# Patient Record
Sex: MALE
[De-identification: names, ages, dates, MRNs, and addresses within clinical notes are randomized per-mention and may not be internally consistent; named-entity substitution may affect disease eponyms.]

---

## 2018-04-20 ENCOUNTER — RX ONLY (OUTPATIENT)
Age: 48
Setting detail: RX ONLY
End: 2018-04-20

## 2018-04-20 ENCOUNTER — APPOINTMENT (RX ONLY)
Dept: URBAN - METROPOLITAN AREA CLINIC 69 | Facility: CLINIC | Age: 48
Setting detail: DERMATOLOGY
End: 2018-04-20

## 2018-04-20 DIAGNOSIS — L30.9 DERMATITIS, UNSPECIFIED: ICD-10-CM

## 2018-04-20 DIAGNOSIS — L73.1 PSEUDOFOLLICULITIS BARBAE: ICD-10-CM

## 2018-04-20 PROCEDURE — ? COUNSELING

## 2018-04-20 PROCEDURE — ? PRESCRIPTION

## 2018-04-20 PROCEDURE — ? TREATMENT REGIMEN

## 2018-04-20 PROCEDURE — 99202 OFFICE O/P NEW SF 15 MIN: CPT

## 2018-04-20 RX ORDER — TERBINAFINE HCL 250 MG
TABLET ORAL
Qty: 14 | Refills: 0 | Status: ERX | COMMUNITY
Start: 2018-04-20

## 2018-04-20 RX ORDER — TAZAROTENE 1 MG/G
CREAM TOPICAL
Qty: 1 | Refills: 3 | Status: ERX | COMMUNITY
Start: 2018-04-20

## 2018-04-20 RX ORDER — BETAMETHASONE DIPROPIONATE 0.5 MG/G
OINTMENT TOPICAL
Qty: 1 | Refills: 2 | Status: ERX | COMMUNITY
Start: 2018-04-20

## 2018-04-20 RX ORDER — FLUOCINONIDE 1 MG/G
CREAM TOPICAL
Qty: 1 | Refills: 3 | Status: ERX | COMMUNITY
Start: 2018-04-20

## 2018-04-20 RX ADMIN — Medication: at 20:03

## 2018-04-20 RX ADMIN — TAZAROTENE: 1 CREAM TOPICAL at 20:12

## 2018-04-20 RX ADMIN — FLUOCINONIDE: 1 CREAM TOPICAL at 20:11

## 2018-04-20 ASSESSMENT — LOCATION ZONE DERM
LOCATION ZONE: NECK
LOCATION ZONE: LEG

## 2018-04-20 ASSESSMENT — LOCATION SIMPLE DESCRIPTION DERM
LOCATION SIMPLE: RIGHT PRETIBIAL REGION
LOCATION SIMPLE: RIGHT ANTERIOR NECK
LOCATION SIMPLE: LEFT ANTERIOR NECK

## 2018-04-20 ASSESSMENT — LOCATION DETAILED DESCRIPTION DERM
LOCATION DETAILED: LEFT INFERIOR LATERAL NECK
LOCATION DETAILED: RIGHT PROXIMAL PRETIBIAL REGION
LOCATION DETAILED: RIGHT SUPERIOR ANTERIOR NECK

## 2018-04-20 NOTE — PROCEDURE: TREATMENT REGIMEN
Plan: Nummular eczema  v. Tinea. Directed to take terbinafine once daily for two weeks and continue using the topical antifungal he bought otc. Direct to rtc in two weeks and will see if treated with orals but if not will treat as eczema and will send for cortisone
Detail Level: Zone

## 2018-04-24 ENCOUNTER — RX ONLY (OUTPATIENT)
Age: 48
Setting detail: RX ONLY
End: 2018-04-24

## 2018-04-24 RX ORDER — TAZAROTENE 1 MG/G
CREAM TOPICAL
Qty: 1 | Refills: 3 | Status: ERX

## 2018-04-24 RX ORDER — FLUOCINONIDE 1 MG/G
CREAM TOPICAL
Qty: 1 | Refills: 3 | Status: ERX

## 2018-04-27 ENCOUNTER — RX ONLY (OUTPATIENT)
Age: 48
Setting detail: RX ONLY
End: 2018-04-27

## 2018-04-27 RX ORDER — FLUOCINONIDE 1 MG/G
CREAM TOPICAL
Qty: 1 | Refills: 3 | Status: ERX

## 2018-04-27 RX ORDER — TAZAROTENE 1 MG/G
CREAM TOPICAL
Qty: 1 | Refills: 3 | Status: ERX

## 2018-05-03 ENCOUNTER — APPOINTMENT (RX ONLY)
Dept: URBAN - METROPOLITAN AREA CLINIC 69 | Facility: CLINIC | Age: 48
Setting detail: DERMATOLOGY
End: 2018-05-03

## 2018-05-03 DIAGNOSIS — L73.1 PSEUDOFOLLICULITIS BARBAE: ICD-10-CM

## 2018-05-03 DIAGNOSIS — L30.0 NUMMULAR DERMATITIS: ICD-10-CM | Status: RESOLVED

## 2018-05-03 PROCEDURE — ? COUNSELING

## 2018-05-03 PROCEDURE — 99213 OFFICE O/P EST LOW 20 MIN: CPT

## 2018-05-03 PROCEDURE — ? TREATMENT REGIMEN

## 2018-05-03 ASSESSMENT — LOCATION DETAILED DESCRIPTION DERM
LOCATION DETAILED: RIGHT PROXIMAL PRETIBIAL REGION
LOCATION DETAILED: LEFT INFERIOR LATERAL NECK
LOCATION DETAILED: RIGHT SUPERIOR ANTERIOR NECK

## 2018-05-03 ASSESSMENT — LOCATION SIMPLE DESCRIPTION DERM
LOCATION SIMPLE: RIGHT PRETIBIAL REGION
LOCATION SIMPLE: LEFT ANTERIOR NECK
LOCATION SIMPLE: RIGHT ANTERIOR NECK

## 2018-05-03 ASSESSMENT — LOCATION ZONE DERM
LOCATION ZONE: NECK
LOCATION ZONE: LEG

## 2018-05-03 NOTE — PROCEDURE: TREATMENT REGIMEN
Detail Level: Zone
Continue Regimen: betamethasone ointment bid to aa prn flares.
Plan: Fluocinonide 0.1% cream after shaving\\nTazarotene 0.1% cream qhs to beard area.

## 2024-01-10 ENCOUNTER — TELEPHONE (OUTPATIENT)
Dept: FAMILY MEDICINE | Facility: CLINIC | Age: 54
End: 2024-01-10

## 2024-01-10 ENCOUNTER — HOSPITAL ENCOUNTER (OUTPATIENT)
Dept: RADIOLOGY | Facility: HOSPITAL | Age: 54
Discharge: HOME | End: 2024-01-10
Attending: STUDENT IN AN ORGANIZED HEALTH CARE EDUCATION/TRAINING PROGRAM
Payer: MEDICAID

## 2024-01-10 ENCOUNTER — OFFICE VISIT (OUTPATIENT)
Dept: FAMILY MEDICINE | Facility: CLINIC | Age: 54
End: 2024-01-10
Payer: MEDICAID

## 2024-01-10 VITALS
WEIGHT: 248 LBS | TEMPERATURE: 98 F | BODY MASS INDEX: 35.5 KG/M2 | HEART RATE: 76 BPM | HEIGHT: 70 IN | OXYGEN SATURATION: 97 % | DIASTOLIC BLOOD PRESSURE: 77 MMHG | SYSTOLIC BLOOD PRESSURE: 114 MMHG

## 2024-01-10 DIAGNOSIS — Z12.12 ENCOUNTER FOR COLORECTAL CANCER SCREENING: ICD-10-CM

## 2024-01-10 DIAGNOSIS — Z23 ENCOUNTER FOR IMMUNIZATION: Primary | ICD-10-CM

## 2024-01-10 DIAGNOSIS — Z00.00 WELLNESS EXAMINATION: ICD-10-CM

## 2024-01-10 DIAGNOSIS — M75.02 ADHESIVE CAPSULITIS OF BOTH SHOULDERS: ICD-10-CM

## 2024-01-10 DIAGNOSIS — Z12.5 PROSTATE CANCER SCREENING: ICD-10-CM

## 2024-01-10 DIAGNOSIS — M75.01 ADHESIVE CAPSULITIS OF BOTH SHOULDERS: ICD-10-CM

## 2024-01-10 DIAGNOSIS — Z12.11 ENCOUNTER FOR COLORECTAL CANCER SCREENING: ICD-10-CM

## 2024-01-10 LAB
ALBUMIN SERPL-MCNC: 4.1 G/DL (ref 3.5–5)
ALBUMIN/GLOB SERPL: 1.2 RATIO (ref 1.1–2)
ALP SERPL-CCNC: 75 UNIT/L (ref 40–150)
ALT SERPL-CCNC: 28 UNIT/L (ref 0–55)
APPEARANCE UR: CLEAR
AST SERPL-CCNC: 19 UNIT/L (ref 5–34)
BACTERIA #/AREA URNS AUTO: ABNORMAL /HPF
BASOPHILS # BLD AUTO: 0.08 X10(3)/MCL
BASOPHILS NFR BLD AUTO: 1.2 %
BILIRUB SERPL-MCNC: 0.3 MG/DL
BILIRUB UR QL STRIP.AUTO: NEGATIVE
BUN SERPL-MCNC: 15 MG/DL (ref 8.4–25.7)
CALCIUM SERPL-MCNC: 9.7 MG/DL (ref 8.4–10.2)
CHLORIDE SERPL-SCNC: 105 MMOL/L (ref 98–107)
CHOLEST SERPL-MCNC: 302 MG/DL
CHOLEST/HDLC SERPL: 6 {RATIO} (ref 0–5)
CO2 SERPL-SCNC: 28 MMOL/L (ref 22–29)
COLOR UR AUTO: ABNORMAL
CREAT SERPL-MCNC: 1.2 MG/DL (ref 0.73–1.18)
DEPRECATED CALCIDIOL+CALCIFEROL SERPL-MC: 30.4 NG/ML (ref 30–80)
EOSINOPHIL # BLD AUTO: 0.63 X10(3)/MCL (ref 0–0.9)
EOSINOPHIL NFR BLD AUTO: 9.5 %
ERYTHROCYTE [DISTWIDTH] IN BLOOD BY AUTOMATED COUNT: 12.3 % (ref 11.5–17)
EST. AVERAGE GLUCOSE BLD GHB EST-MCNC: 116.9 MG/DL
GFR SERPLBLD CREATININE-BSD FMLA CKD-EPI: >60 MLS/MIN/1.73/M2
GLOBULIN SER-MCNC: 3.5 GM/DL (ref 2.4–3.5)
GLUCOSE SERPL-MCNC: 96 MG/DL (ref 74–100)
GLUCOSE UR QL STRIP.AUTO: NORMAL
HBA1C MFR BLD: 5.7 %
HCT VFR BLD AUTO: 46.1 % (ref 42–52)
HCV AB SERPL QL IA: NONREACTIVE
HDLC SERPL-MCNC: 54 MG/DL (ref 35–60)
HGB BLD-MCNC: 15.3 G/DL (ref 14–18)
HIV 1+2 AB+HIV1 P24 AG SERPL QL IA: NONREACTIVE
HYALINE CASTS #/AREA URNS LPF: ABNORMAL /LPF
IMM GRANULOCYTES # BLD AUTO: 0.08 X10(3)/MCL (ref 0–0.04)
IMM GRANULOCYTES NFR BLD AUTO: 1.2 %
KETONES UR QL STRIP.AUTO: NEGATIVE
LDLC SERPL CALC-MCNC: 202 MG/DL (ref 50–140)
LEUKOCYTE ESTERASE UR QL STRIP.AUTO: NEGATIVE
LYMPHOCYTES # BLD AUTO: 2.56 X10(3)/MCL (ref 0.6–4.6)
LYMPHOCYTES NFR BLD AUTO: 38.7 %
MCH RBC QN AUTO: 30.8 PG (ref 27–31)
MCHC RBC AUTO-ENTMCNC: 33.2 G/DL (ref 33–36)
MCV RBC AUTO: 92.8 FL (ref 80–94)
MONOCYTES # BLD AUTO: 0.57 X10(3)/MCL (ref 0.1–1.3)
MONOCYTES NFR BLD AUTO: 8.6 %
MUCOUS THREADS URNS QL MICRO: ABNORMAL /LPF
NEUTROPHILS # BLD AUTO: 2.69 X10(3)/MCL (ref 2.1–9.2)
NEUTROPHILS NFR BLD AUTO: 40.8 %
NITRITE UR QL STRIP.AUTO: NEGATIVE
NRBC BLD AUTO-RTO: 0 %
PH UR STRIP.AUTO: 5 [PH]
PLATELET # BLD AUTO: 243 X10(3)/MCL (ref 130–400)
PMV BLD AUTO: 10.8 FL (ref 7.4–10.4)
POTASSIUM SERPL-SCNC: 4.1 MMOL/L (ref 3.5–5.1)
PROT SERPL-MCNC: 7.6 GM/DL (ref 6.4–8.3)
PROT UR QL STRIP.AUTO: NEGATIVE
PSA SERPL-MCNC: 0.46 NG/ML
RBC # BLD AUTO: 4.97 X10(6)/MCL (ref 4.7–6.1)
RBC #/AREA URNS AUTO: ABNORMAL /HPF
RBC UR QL AUTO: NEGATIVE
SODIUM SERPL-SCNC: 142 MMOL/L (ref 136–145)
SP GR UR STRIP.AUTO: 1.02 (ref 1–1.03)
SQUAMOUS #/AREA URNS LPF: ABNORMAL /HPF
T4 FREE SERPL-MCNC: 0.83 NG/DL (ref 0.7–1.48)
TRIGL SERPL-MCNC: 229 MG/DL (ref 34–140)
TSH SERPL-ACNC: 7.52 UIU/ML (ref 0.35–4.94)
UROBILINOGEN UR STRIP-ACNC: NORMAL
VLDLC SERPL CALC-MCNC: 46 MG/DL
WBC # SPEC AUTO: 6.61 X10(3)/MCL (ref 4.5–11.5)
WBC #/AREA URNS AUTO: ABNORMAL /HPF

## 2024-01-10 PROCEDURE — 99386 PREV VISIT NEW AGE 40-64: CPT | Mod: S$PBB,,, | Performed by: STUDENT IN AN ORGANIZED HEALTH CARE EDUCATION/TRAINING PROGRAM

## 2024-01-10 PROCEDURE — 84153 ASSAY OF PSA TOTAL: CPT | Performed by: STUDENT IN AN ORGANIZED HEALTH CARE EDUCATION/TRAINING PROGRAM

## 2024-01-10 PROCEDURE — 80061 LIPID PANEL: CPT | Performed by: STUDENT IN AN ORGANIZED HEALTH CARE EDUCATION/TRAINING PROGRAM

## 2024-01-10 PROCEDURE — 85025 COMPLETE CBC W/AUTO DIFF WBC: CPT | Performed by: STUDENT IN AN ORGANIZED HEALTH CARE EDUCATION/TRAINING PROGRAM

## 2024-01-10 PROCEDURE — 86803 HEPATITIS C AB TEST: CPT | Performed by: STUDENT IN AN ORGANIZED HEALTH CARE EDUCATION/TRAINING PROGRAM

## 2024-01-10 PROCEDURE — 83036 HEMOGLOBIN GLYCOSYLATED A1C: CPT | Performed by: STUDENT IN AN ORGANIZED HEALTH CARE EDUCATION/TRAINING PROGRAM

## 2024-01-10 PROCEDURE — 82306 VITAMIN D 25 HYDROXY: CPT | Performed by: STUDENT IN AN ORGANIZED HEALTH CARE EDUCATION/TRAINING PROGRAM

## 2024-01-10 PROCEDURE — 73030 X-RAY EXAM OF SHOULDER: CPT | Mod: TC,50,PN

## 2024-01-10 PROCEDURE — 3078F DIAST BP <80 MM HG: CPT | Mod: CPTII,,, | Performed by: STUDENT IN AN ORGANIZED HEALTH CARE EDUCATION/TRAINING PROGRAM

## 2024-01-10 PROCEDURE — 3074F SYST BP LT 130 MM HG: CPT | Mod: CPTII,,, | Performed by: STUDENT IN AN ORGANIZED HEALTH CARE EDUCATION/TRAINING PROGRAM

## 2024-01-10 PROCEDURE — 80053 COMPREHEN METABOLIC PANEL: CPT | Performed by: STUDENT IN AN ORGANIZED HEALTH CARE EDUCATION/TRAINING PROGRAM

## 2024-01-10 PROCEDURE — 3008F BODY MASS INDEX DOCD: CPT | Mod: CPTII,,, | Performed by: STUDENT IN AN ORGANIZED HEALTH CARE EDUCATION/TRAINING PROGRAM

## 2024-01-10 PROCEDURE — 84439 ASSAY OF FREE THYROXINE: CPT | Performed by: STUDENT IN AN ORGANIZED HEALTH CARE EDUCATION/TRAINING PROGRAM

## 2024-01-10 PROCEDURE — 36415 COLL VENOUS BLD VENIPUNCTURE: CPT | Performed by: STUDENT IN AN ORGANIZED HEALTH CARE EDUCATION/TRAINING PROGRAM

## 2024-01-10 PROCEDURE — 81001 URINALYSIS AUTO W/SCOPE: CPT | Performed by: STUDENT IN AN ORGANIZED HEALTH CARE EDUCATION/TRAINING PROGRAM

## 2024-01-10 PROCEDURE — 87389 HIV-1 AG W/HIV-1&-2 AB AG IA: CPT | Performed by: STUDENT IN AN ORGANIZED HEALTH CARE EDUCATION/TRAINING PROGRAM

## 2024-01-10 PROCEDURE — 90686 IIV4 VACC NO PRSV 0.5 ML IM: CPT | Mod: PBBFAC,PN

## 2024-01-10 PROCEDURE — 99204 OFFICE O/P NEW MOD 45 MIN: CPT | Mod: PBBFAC,PN | Performed by: STUDENT IN AN ORGANIZED HEALTH CARE EDUCATION/TRAINING PROGRAM

## 2024-01-10 PROCEDURE — 84443 ASSAY THYROID STIM HORMONE: CPT | Performed by: STUDENT IN AN ORGANIZED HEALTH CARE EDUCATION/TRAINING PROGRAM

## 2024-01-10 RX ORDER — GUAIFENESIN 100 MG/5ML
81 LIQUID (ML) ORAL DAILY
COMMUNITY

## 2024-01-10 RX ORDER — CYCLOBENZAPRINE HCL 10 MG
10 TABLET ORAL 3 TIMES DAILY PRN
Qty: 90 TABLET | Refills: 3 | Status: SHIPPED | OUTPATIENT
Start: 2024-01-10 | End: 2024-05-09

## 2024-01-10 RX ORDER — HYDROGEN PEROXIDE 3 %
20 SOLUTION, NON-ORAL MISCELLANEOUS
COMMUNITY

## 2024-01-10 NOTE — TELEPHONE ENCOUNTER
----- Message from Arina Mares sent at 1/10/2024 11:36 AM CST -----  Regarding: Eye clinc  Pt requesting a referral to the [facility]

## 2024-01-10 NOTE — ASSESSMENT & PLAN NOTE
Labs as below   Influenza vaccine today   Declines shingles vaccine but will give information on vaccine  Denies immediate family history of colon cancer therefore will send Cologuard

## 2024-01-10 NOTE — PROGRESS NOTES
Patient Name: Jhonathan Kim     : 1970    MRN: [id number]     Subjective:     Patient ID: Jhonathan Kim is a 53 y.o. male.    Chief Complaint:   Chief Complaint   Patient presents with    Establish Care     Pain in both shoulders describes it as tightness that has been going on for about a year. Would like flu shot. 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 See HPI for details.    History:     Past Medical History:   Diagnosis Date    ADHD (attention deficit hyperactivity disorder)     Depression         Past Surgical History:   Procedure Laterality Date    NASAL ENDOSCOPY  2016       History reviewed. No pertinent family history.     Social History     Tobacco Use    Smoking status: Never    Smokeless tobacco: Never   Substance and Sexual Activity    Alcohol use: Yes     Alcohol/week: 2.0 standard drinks of alcohol     Types: 2 Cans of beer per week     Comment: occ    Drug use: Never    Sexual activity: Yes       Current Outpatient Medications   Medication Instructions    aspirin 81 mg, Oral, Daily    cyclobenzaprine (FLEXERIL) 10 mg, Oral, 3 times daily PRN    esomeprazole (NEXIUM) 20 mg,  "Oral, Before breakfast        Review of patient's allergies indicates:  No Known Allergies    Objective:     Visit Vitals  /77 (BP Location: Left arm, Patient Position: Sitting)   Pulse 76   Temp 97.6 °F (36.4 °C) (Oral)   Ht 5' 10" (1.778 m)   Wt 112.5 kg (248 lb)   SpO2 97%   BMI 35.58 kg/m²       Physical Examination:     Physical Exam  Constitutional:       General: He is not in acute distress.  Eyes:      General: No scleral icterus.     Extraocular Movements: Extraocular movements intact.      Conjunctiva/sclera: Conjunctivae normal.      Pupils: Pupils are equal, round, and reactive to light.   Cardiovascular:      Rate and Rhythm: Normal rate and regular rhythm.      Heart sounds: No murmur heard.  Pulmonary:      Effort: Pulmonary effort is normal. No respiratory distress.      Breath sounds: No stridor. No wheezing or rhonchi.   Abdominal:      General: Bowel sounds are normal. There is no distension.      Palpations: Abdomen is soft.      Tenderness: There is no abdominal tenderness. There is no guarding.   Musculoskeletal:         General: No swelling.      Comments: Tightness noted across bilateral shoulders negative empty can test patient has negative drop test patient unable to move bilateral arms behind his shoulder reach across unable to extend arms above the level of his head  Shoulder with click upon extension on left side   Skin:     General: Skin is warm and dry.      Coloration: Skin is not jaundiced.      Findings: No rash.   Neurological:      Mental Status: He is alert.      Gait: Gait normal.   Psychiatric:         Thought Content: Thought content normal.         Lab Results:     Chemistry:  No results found for: "NA", "K", "CHLORIDE", "BUN", "CREATININE", "EGFRNORACEVR", "GLUCOSE", "CALCIUM", "ALKPHOS", "LABPROT", "ALBUMIN", "BILIDIR", "IBILI", "AST", "ALT", "MG", "PHOS", "[id number]", "TSH", "[id number]", "PSA"     No results found for: "HGBA1C", "MICROALBCREA" " "    Hematology:  No results found for: "WBC", "HGB", "HCT", "PLT"    Lipid Panel:  No results found for: "CHOL", "HDL", "LDL", "TRIG", "TOTALCHOLEST"     Urine:  No results found for: "COLORUA", "APPEARANCEUA", "SGUA", "PHUA", "PROTEINUA", "GLUCOSEUA", "KETONESUA", "BLOODUA", "NITRITESUA", "LEUKOCYTESUR", "RBCUA", "WBCUA", "BACTERIA", "SQEPUA", "HYALINECASTS", "CREATRANDUR", "PROTEINURINE", "UPROTCREA"     Assessment:          ICD-10-CM ICD-9-CM   1. Encounter for immunization  Z23 V03.89   2. Wellness examination  Z00.00 V70.0   3. Prostate cancer screening  Z12.5 V76.44   4. Adhesive capsulitis of both shoulders  M75.01 726.0    M75.02    5. Encounter for colorectal cancer screening  Z12.11 V76.51    Z12.12 V76.41        Plan:     1. Encounter for immunization  -     Influenza - Quadrivalent *Preferred* (6 months+) (PF)    2. Wellness examination  Assessment & Plan:  Labs as below   Influenza vaccine today   Declines shingles vaccine but will give information on vaccine  Denies immediate family history of colon cancer therefore will send Cologuard    Orders:  -     CBC Auto Differential; Future; Expected date: 01/10/2024  -     Comprehensive Metabolic Panel; Future; Expected date: 01/10/2024  -     Lipid Panel; Future; Expected date: 01/10/2024  -     TSH; Future; Expected date: 01/10/2024  -     Hemoglobin A1C; Future; Expected date: 01/10/2024  -     Urinalysis; Future; Expected date: 01/10/2024  -     PSA, Screening; Future; Expected date: 01/10/2024  -     T4, Free; Future; Expected date: 01/10/2024  -     Vitamin D; Future; Expected date: 01/10/2024  -     Hepatitis C Antibody; Future; Expected date: 01/10/2024  -     HIV 1/2 Ag/Ab (4th Gen); Future; Expected date: 01/10/2024    3. Prostate cancer screening  -     PSA, Screening; Future; Expected date: 01/10/2024    4. Adhesive capsulitis of both shoulders  Assessment & Plan:  Flexeril prescribed and given handout on frozen shoulder exercises   X-ray today "   Referral to physical therapy once kidney function returns will decide if patient is good candidate for meloxicam versus pregnant    Orders:  -     X-Ray Shoulder Complete Bilateral; Future; Expected date: 01/10/2024  -     cyclobenzaprine (FLEXERIL) 10 MG tablet; Take 1 tablet (10 mg total) by mouth 3 (three) times daily as needed for Muscle spasms.  Dispense: 90 tablet; Refill: 3  -     Ambulatory referral/consult to Physical/Occupational Therapy; Future; Expected date: 01/17/2024    5. Encounter for colorectal cancer screening  -     Cologuard Screening (Multitarget Stool DNA); Future; Expected date: 01/10/2024         Follow up in about 6 weeks (around 2/21/2024) for lab results and shoulder pain .    No future appointments.     Roz Morgan MD

## 2024-01-10 NOTE — ASSESSMENT & PLAN NOTE
Flexeril prescribed and given handout on frozen shoulder exercises   X-ray today   Referral to physical therapy once kidney function returns will decide if patient is good candidate for meloxicam versus pregnant

## 2024-01-10 NOTE — TELEPHONE ENCOUNTER
Please let patient know that [facility] eye [facility] does see patients with his insurance but typically not for just a vision exam. Usually there has to be some other problem like diabetes etc. I don't have anything in the note about vision discussed today or any health problems that would require to be seen by them so please ask him what the issue is? No

## 2024-01-11 DIAGNOSIS — H53.8 BLURRY VISION, BILATERAL: Primary | ICD-10-CM

## 2024-01-12 ENCOUNTER — TELEPHONE (OUTPATIENT)
Dept: FAMILY MEDICINE | Facility: CLINIC | Age: 54
End: 2024-01-12
Payer: MEDICAID

## 2024-01-12 NOTE — TELEPHONE ENCOUNTER
----- Message from Roz Morgan MD sent at 1/12/2024  7:38 AM CST -----  Shelley,   Can you please let this patient know that his right shoulder Xray shows that the joint in front of his right shoulder has a wider space than the joint on his left? This indicates usually that at some point he did injure his shoulder through a fall or other type of trauma? It is remote like it happened a long time ago.     I have referred him to PT at we discussed at our visit.

## 2024-01-12 NOTE — PROGRESS NOTES
Shelley,   Can you please let this patient know that his right shoulder Xray shows that the joint in front of his right shoulder has a wider space than the joint on his left? This indicates usually that at some point he did injure his shoulder through a fall or other type of trauma? It is remote like it happened a long time ago.     I have referred him to PT at we discussed at our visit.

## 2024-01-18 ENCOUNTER — TELEPHONE (OUTPATIENT)
Dept: FAMILY MEDICINE | Facility: CLINIC | Age: 54
End: 2024-01-18
Payer: MEDICAID

## 2024-01-18 DIAGNOSIS — M25.512 CHRONIC PAIN OF BOTH SHOULDERS: Primary | ICD-10-CM

## 2024-01-18 DIAGNOSIS — M25.511 CHRONIC PAIN OF BOTH SHOULDERS: Primary | ICD-10-CM

## 2024-01-18 DIAGNOSIS — G89.29 CHRONIC PAIN OF BOTH SHOULDERS: Primary | ICD-10-CM

## 2024-01-18 RX ORDER — MELOXICAM 15 MG/1
15 TABLET ORAL DAILY
Qty: 30 TABLET | Refills: 1 | Status: SHIPPED | OUTPATIENT
Start: 2024-01-18 | End: 2024-04-19 | Stop reason: SDUPTHER

## 2024-01-18 NOTE — TELEPHONE ENCOUNTER
----- Message from Roz Morgan MD sent at 1/18/2024  9:14 AM CST -----  Please let patient know that his kidney function came back normal and I prescribing meloxicam for him to take for his shoulder.   One of his values of his thyroid was elevated but we will recheck this when he comes in February to see me.

## 2024-01-18 NOTE — PROGRESS NOTES
Please let patient know that his kidney function came back normal and I prescribing meloxicam for him to take for his shoulder.   One of his values of his thyroid was elevated but we will recheck this when he comes in February to see me.

## 2024-01-27 LAB — NONINV COLON CA DNA+OCC BLD SCRN STL QL: NEGATIVE

## 2024-04-15 PROBLEM — Z00.00 WELLNESS EXAMINATION: Status: RESOLVED | Noted: 2024-01-10 | Resolved: 2024-04-15

## 2024-04-18 ENCOUNTER — OFFICE VISIT (OUTPATIENT)
Dept: FAMILY MEDICINE | Facility: CLINIC | Age: 54
End: 2024-04-18
Payer: MEDICAID

## 2024-04-18 VITALS
TEMPERATURE: 98 F | SYSTOLIC BLOOD PRESSURE: 116 MMHG | WEIGHT: 246 LBS | OXYGEN SATURATION: 98 % | BODY MASS INDEX: 35.22 KG/M2 | HEART RATE: 65 BPM | DIASTOLIC BLOOD PRESSURE: 80 MMHG | HEIGHT: 70 IN

## 2024-04-18 DIAGNOSIS — M25.511 CHRONIC PAIN OF BOTH SHOULDERS: ICD-10-CM

## 2024-04-18 DIAGNOSIS — E78.2 MIXED HYPERLIPIDEMIA: ICD-10-CM

## 2024-04-18 DIAGNOSIS — F32.0 CURRENT MILD EPISODE OF MAJOR DEPRESSIVE DISORDER WITHOUT PRIOR EPISODE: ICD-10-CM

## 2024-04-18 DIAGNOSIS — M75.02 ADHESIVE CAPSULITIS OF BOTH SHOULDERS: ICD-10-CM

## 2024-04-18 DIAGNOSIS — M75.01 ADHESIVE CAPSULITIS OF BOTH SHOULDERS: ICD-10-CM

## 2024-04-18 DIAGNOSIS — R79.89 ELEVATED TSH: ICD-10-CM

## 2024-04-18 DIAGNOSIS — F32.A DEPRESSION, UNSPECIFIED DEPRESSION TYPE: ICD-10-CM

## 2024-04-18 DIAGNOSIS — M25.512 CHRONIC PAIN OF BOTH SHOULDERS: ICD-10-CM

## 2024-04-18 DIAGNOSIS — Z23 ENCOUNTER FOR IMMUNIZATION: Primary | ICD-10-CM

## 2024-04-18 DIAGNOSIS — G89.29 CHRONIC PAIN OF BOTH SHOULDERS: ICD-10-CM

## 2024-04-18 LAB
T4 FREE SERPL-MCNC: 0.77 NG/DL (ref 0.7–1.48)
TSH SERPL-ACNC: 4.02 UIU/ML (ref 0.35–4.94)

## 2024-04-18 PROCEDURE — 3008F BODY MASS INDEX DOCD: CPT | Mod: CPTII,,, | Performed by: STUDENT IN AN ORGANIZED HEALTH CARE EDUCATION/TRAINING PROGRAM

## 2024-04-18 PROCEDURE — 84443 ASSAY THYROID STIM HORMONE: CPT | Performed by: STUDENT IN AN ORGANIZED HEALTH CARE EDUCATION/TRAINING PROGRAM

## 2024-04-18 PROCEDURE — 84439 ASSAY OF FREE THYROXINE: CPT | Performed by: STUDENT IN AN ORGANIZED HEALTH CARE EDUCATION/TRAINING PROGRAM

## 2024-04-18 PROCEDURE — 1159F MED LIST DOCD IN RCRD: CPT | Mod: CPTII,,, | Performed by: STUDENT IN AN ORGANIZED HEALTH CARE EDUCATION/TRAINING PROGRAM

## 2024-04-18 PROCEDURE — 99214 OFFICE O/P EST MOD 30 MIN: CPT | Mod: S$PBB,,, | Performed by: STUDENT IN AN ORGANIZED HEALTH CARE EDUCATION/TRAINING PROGRAM

## 2024-04-18 PROCEDURE — 3079F DIAST BP 80-89 MM HG: CPT | Mod: CPTII,,, | Performed by: STUDENT IN AN ORGANIZED HEALTH CARE EDUCATION/TRAINING PROGRAM

## 2024-04-18 PROCEDURE — 90750 HZV VACC RECOMBINANT IM: CPT | Mod: PBBFAC,PN

## 2024-04-18 PROCEDURE — 36415 COLL VENOUS BLD VENIPUNCTURE: CPT | Performed by: STUDENT IN AN ORGANIZED HEALTH CARE EDUCATION/TRAINING PROGRAM

## 2024-04-18 PROCEDURE — 3044F HG A1C LEVEL LT 7.0%: CPT | Mod: CPTII,,, | Performed by: STUDENT IN AN ORGANIZED HEALTH CARE EDUCATION/TRAINING PROGRAM

## 2024-04-18 PROCEDURE — 99213 OFFICE O/P EST LOW 20 MIN: CPT | Mod: PBBFAC,PN | Performed by: STUDENT IN AN ORGANIZED HEALTH CARE EDUCATION/TRAINING PROGRAM

## 2024-04-18 PROCEDURE — 90471 IMMUNIZATION ADMIN: CPT | Mod: PBBFAC,PN

## 2024-04-18 PROCEDURE — 3074F SYST BP LT 130 MM HG: CPT | Mod: CPTII,,, | Performed by: STUDENT IN AN ORGANIZED HEALTH CARE EDUCATION/TRAINING PROGRAM

## 2024-04-18 RX ORDER — DULOXETIN HYDROCHLORIDE 30 MG/1
30 CAPSULE, DELAYED RELEASE ORAL DAILY
Qty: 30 CAPSULE | Refills: 11 | Status: SHIPPED | OUTPATIENT
Start: 2024-04-18 | End: 2024-05-27

## 2024-04-18 RX ADMIN — ZOSTER VACCINE RECOMBINANT, ADJUVANTED 0.5 ML: KIT at 10:04

## 2024-04-19 ENCOUNTER — TELEPHONE (OUTPATIENT)
Dept: FAMILY MEDICINE | Facility: CLINIC | Age: 54
End: 2024-04-19
Payer: MEDICAID

## 2024-04-19 PROBLEM — F32.0 CURRENT MILD EPISODE OF MAJOR DEPRESSIVE DISORDER WITHOUT PRIOR EPISODE: Status: ACTIVE | Noted: 2024-04-19

## 2024-04-19 PROBLEM — R79.89 ELEVATED TSH: Status: ACTIVE | Noted: 2024-04-19

## 2024-04-19 PROBLEM — E78.2 MIXED HYPERLIPIDEMIA: Status: ACTIVE | Noted: 2024-04-19

## 2024-04-19 RX ORDER — CYCLOBENZAPRINE HCL 10 MG
10 TABLET ORAL 3 TIMES DAILY PRN
Qty: 90 TABLET | Refills: 3 | Status: SHIPPED | OUTPATIENT
Start: 2024-04-19 | End: 2024-08-17

## 2024-04-19 RX ORDER — MELOXICAM 15 MG/1
15 TABLET ORAL DAILY
Qty: 30 TABLET | Refills: 3 | Status: SHIPPED | OUTPATIENT
Start: 2024-04-19

## 2024-04-19 NOTE — ASSESSMENT & PLAN NOTE
Patient is improving somewhat especially on the right will continue physical therapy if unrelieved will refer patient to orthopedics and order MRI

## 2024-04-19 NOTE — PROGRESS NOTES
Can you please call patient and let him know that his thyroid labs came back normal? No change in treatment is necessary.

## 2024-04-19 NOTE — ASSESSMENT & PLAN NOTE
Treatment options discussed with patient and patient elects to try Cymbalta 30 mg daily   Discussed medication side effects with patient   Also referring patient to therapy

## 2024-04-19 NOTE — TELEPHONE ENCOUNTER
----- Message from Roz Morgan MD sent at 4/19/2024  7:40 AM CDT -----  Can you please call patient and let him know that his thyroid labs came back normal? No change in treatment is necessary.

## 2024-04-19 NOTE — PROGRESS NOTES
Patient Name: Jhonathan Kim     : 1970    MRN: [id number]     Subjective:     Patient ID: Jhonathan Kim is a 53 y.o. male.    Chief Complaint:   Chief Complaint   Patient presents with    Follow-up     Patient here for follow up. He is going to therapy and doing good. He has some pain today rates it a 6 in his shoulders. He is okay with getting his shingles vaccine today. /80        HPI: HPI     53-year-old male presents to clinic to follow-up for his shoulders he is attending therapy which he states is helping but he does continue to have issues with left shoulder predominantly  Was unaware that x-ray of shoulder      Hyperlipidemia  Noted on labs  Patient states he is not amenable to medication at this time would prefer to work on diet and exercise      Patient also has a new issue of depression   No SI or HI but states that he often finds himself very  Sad since his divorce is amenable to medication is amenable to therapy    surgical history nasal surgery 5 years ago  Family history positive for diabetes  Social ffpjoxb-xgrdswnzd-rgagaxfxea 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 See HPI for details.    History:     Past Medical History:   Diagnosis Date    ADHD (attention deficit hyperactivity disorder)     Depression         Past Surgical History:   Procedure Laterality Date    NASAL ENDOSCOPY  2016       No family history on file.     Social History     Tobacco Use    Smoking status: Never    Smokeless tobacco: Never   Substance and Sexual Activity    Alcohol use: Yes     Alcohol/week: 2.0 standard drinks of alcohol     Types: 2 Cans of beer per week     Comment: occ    Drug use: Never    Sexual activity: Yes       Current Outpatient Medications   Medication Instructions    aspirin 81 mg, Oral, Daily    cyclobenzaprine (FLEXERIL) 10 mg,  "Oral, 3 times daily PRN    DULoxetine (CYMBALTA) 30 mg, Oral, Daily    esomeprazole (NEXIUM) 20 mg, Oral, Before breakfast    meloxicam (MOBIC) 15 mg, Oral, Daily        Review of patient's allergies indicates:  No Known Allergies    Objective:     Visit Vitals  /80 (BP Location: Right arm, Patient Position: Sitting)   Pulse 65   Temp 97.6 °F (36.4 °C) (Oral)   Ht 5' 10" (1.778 m)   Wt 111.6 kg (246 lb)   SpO2 98%   BMI 35.30 kg/m²       Physical Examination:     Physical Exam  Constitutional:       General: He is not in acute distress.     Appearance: Normal appearance. He is not ill-appearing or diaphoretic.   HENT:      Nose: No congestion.   Eyes:      Conjunctiva/sclera: Conjunctivae normal.      Pupils: Pupils are equal, round, and reactive to light.   Cardiovascular:      Rate and Rhythm: Normal rate and regular rhythm.      Heart sounds: No murmur heard.  Pulmonary:      Effort: Pulmonary effort is normal. No respiratory distress.      Breath sounds: Normal breath sounds. No wheezing.   Musculoskeletal:         General: No swelling, tenderness, deformity or signs of injury. Normal range of motion.      Cervical back: Normal range of motion.   Skin:     General: Skin is warm and dry.      Coloration: Skin is not jaundiced.   Neurological:      General: No focal deficit present.      Mental Status: He is alert and oriented to person, place, and time. Mental status is at baseline.      Gait: Gait normal.         Lab Results:     Chemistry:  Lab Results   Component Value Date     01/10/2024    K 4.1 01/10/2024    CHLORIDE 105 01/10/2024    BUN 15.0 01/10/2024    CREATININE 1.20 (H) 01/10/2024    EGFRNORACEVR >60 01/10/2024    GLUCOSE 96 01/10/2024    CALCIUM 9.7 01/10/2024    ALKPHOS 75 01/10/2024    LABPROT 7.6 01/10/2024    ALBUMIN 4.1 01/10/2024    AST 19 01/10/2024    ALT 28 01/10/2024    [id number] 30.4 01/10/2024    TSH 4.019 04/18/2024    KTSXAC3IDQN 0.77 04/18/2024    PSA 0.46 01/10/2024    "     Lab Results   Component Value Date    HGBA1C 5.7 01/10/2024        Hematology:  Lab Results   Component Value Date    WBC 6.61 01/10/2024    HGB 15.3 01/10/2024    HCT 46.1 01/10/2024     01/10/2024       Lipid Panel:  Lab Results   Component Value Date    CHOL 302 (H) 01/10/2024    HDL 54 01/10/2024    .00 (H) 01/10/2024    TRIG 229 (H) 01/10/2024    TOTALCHOLEST 6 (H) 01/10/2024        Urine:  Lab Results   Component Value Date    COLORUA Light-Yellow 01/10/2024    APPEARANCEUA Clear 01/10/2024    SGUA 1.025 01/10/2024    PHUA 5.0 01/10/2024    PROTEINUA Negative 01/10/2024    GLUCOSEUA Normal 01/10/2024    KETONESUA Negative 01/10/2024    BLOODUA Negative 01/10/2024    NITRITESUA Negative 01/10/2024    LEUKOCYTESUR Negative 01/10/2024    RBCUA 0-5 01/10/2024    WBCUA 0-5 01/10/2024    BACTERIA None Seen 01/10/2024    SQEPUA None Seen 01/10/2024    HYALINECASTS None Seen 01/10/2024        Assessment:          ICD-10-CM ICD-9-CM   1. Encounter for immunization  Z23 V03.89   2. Depression, unspecified depression type  F32.A 311   3. Elevated TSH  R79.89 794.5   4. Adhesive capsulitis of both shoulders  M75.01 726.0    M75.02    5. Current mild episode of major depressive disorder without prior episode  F32.0 296.21   6. Mixed hyperlipidemia  E78.2 272.2   7. Chronic pain of both shoulders  M25.511 719.41    G89.29 338.29    M25.512         Plan:     1. Encounter for immunization  -     varicella-zoster GE-AS01B (PF)(SHINGRIX) 50 mcg/0.5 mL KIT    2. Depression, unspecified depression type  -     DULoxetine (CYMBALTA) 30 MG capsule; Take 1 capsule (30 mg total) by mouth once daily.  Dispense: 30 capsule; Refill: 11  -     TSH; Future; Expected date: 04/18/2024  -     T4, Free; Future; Expected date: 04/18/2024  -     Ambulatory referral/consult to Behavioral Health; Future; Expected date: 04/26/2024    3. Elevated TSH  Assessment & Plan:  Noted on previous lab work repeat today was within normal  limits will continue to monitor    Orders:  -     TSH; Future; Expected date: 04/18/2024  -     T4, Free; Future; Expected date: 04/18/2024    4. Adhesive capsulitis of both shoulders  Assessment & Plan:  Patient is improving somewhat especially on the right will continue physical therapy if unrelieved will refer patient to orthopedics and order MRI    Orders:  -     cyclobenzaprine (FLEXERIL) 10 MG tablet; Take 1 tablet (10 mg total) by mouth 3 (three) times daily as needed for Muscle spasms.  Dispense: 90 tablet; Refill: 3    5. Current mild episode of major depressive disorder without prior episode  Assessment & Plan:  Treatment options discussed with patient and patient elects to try Cymbalta 30 mg daily   Discussed medication side effects with patient   Also referring patient to therapy      6. Mixed hyperlipidemia  Assessment & Plan:  Patient not amenable to a statin at this time would like to try diet and exercise      7. Chronic pain of both shoulders  -     meloxicam (MOBIC) 15 MG tablet; Take 1 tablet (15 mg total) by mouth once daily.  Dispense: 30 tablet; Refill: 3         Follow up in about 1 month (around 5/18/2024) for depression.    Future Appointments   Date Time Provider Department Center   5/27/2024  9:40 AM Roz Morgan MD [facility]        Roz Morgan MD

## 2024-05-27 ENCOUNTER — OFFICE VISIT (OUTPATIENT)
Dept: FAMILY MEDICINE | Facility: CLINIC | Age: 54
End: 2024-05-27
Payer: MEDICAID

## 2024-05-27 VITALS
HEIGHT: 70 IN | DIASTOLIC BLOOD PRESSURE: 82 MMHG | TEMPERATURE: 98 F | WEIGHT: 244 LBS | OXYGEN SATURATION: 98 % | SYSTOLIC BLOOD PRESSURE: 129 MMHG | HEART RATE: 67 BPM | BODY MASS INDEX: 34.93 KG/M2

## 2024-05-27 DIAGNOSIS — F32.0 CURRENT MILD EPISODE OF MAJOR DEPRESSIVE DISORDER WITHOUT PRIOR EPISODE: ICD-10-CM

## 2024-05-27 DIAGNOSIS — E78.49 OTHER HYPERLIPIDEMIA: ICD-10-CM

## 2024-05-27 DIAGNOSIS — R79.89 ELEVATED SERUM CREATININE: ICD-10-CM

## 2024-05-27 DIAGNOSIS — E78.2 MODERATE MIXED HYPERLIPIDEMIA NOT REQUIRING STATIN THERAPY: Primary | ICD-10-CM

## 2024-05-27 DIAGNOSIS — E78.2 MIXED HYPERLIPIDEMIA: Primary | ICD-10-CM

## 2024-05-27 LAB
ANION GAP SERPL CALC-SCNC: 10 MEQ/L
BUN SERPL-MCNC: 14.6 MG/DL (ref 8.4–25.7)
CALCIUM SERPL-MCNC: 9.2 MG/DL (ref 8.4–10.2)
CHLORIDE SERPL-SCNC: 109 MMOL/L (ref 98–107)
CHOLEST SERPL-MCNC: 216 MG/DL
CHOLEST/HDLC SERPL: 5 {RATIO} (ref 0–5)
CO2 SERPL-SCNC: 21 MMOL/L (ref 22–29)
CREAT SERPL-MCNC: 1.15 MG/DL (ref 0.73–1.18)
CREAT/UREA NIT SERPL: 13
GFR SERPLBLD CREATININE-BSD FMLA CKD-EPI: >60 ML/MIN/1.73/M2
GLUCOSE SERPL-MCNC: 96 MG/DL (ref 74–100)
HDLC SERPL-MCNC: 47 MG/DL (ref 35–60)
LDLC SERPL CALC-MCNC: 140 MG/DL (ref 50–140)
POTASSIUM SERPL-SCNC: 3.9 MMOL/L (ref 3.5–5.1)
SODIUM SERPL-SCNC: 140 MMOL/L (ref 136–145)
TRIGL SERPL-MCNC: 147 MG/DL (ref 34–140)
VLDLC SERPL CALC-MCNC: 29 MG/DL

## 2024-05-27 PROCEDURE — 36415 COLL VENOUS BLD VENIPUNCTURE: CPT | Performed by: STUDENT IN AN ORGANIZED HEALTH CARE EDUCATION/TRAINING PROGRAM

## 2024-05-27 PROCEDURE — 1159F MED LIST DOCD IN RCRD: CPT | Mod: CPTII,,, | Performed by: STUDENT IN AN ORGANIZED HEALTH CARE EDUCATION/TRAINING PROGRAM

## 2024-05-27 PROCEDURE — 99213 OFFICE O/P EST LOW 20 MIN: CPT | Mod: PBBFAC,PN | Performed by: STUDENT IN AN ORGANIZED HEALTH CARE EDUCATION/TRAINING PROGRAM

## 2024-05-27 PROCEDURE — 3079F DIAST BP 80-89 MM HG: CPT | Mod: CPTII,,, | Performed by: STUDENT IN AN ORGANIZED HEALTH CARE EDUCATION/TRAINING PROGRAM

## 2024-05-27 PROCEDURE — 80061 LIPID PANEL: CPT | Performed by: STUDENT IN AN ORGANIZED HEALTH CARE EDUCATION/TRAINING PROGRAM

## 2024-05-27 PROCEDURE — 3044F HG A1C LEVEL LT 7.0%: CPT | Mod: CPTII,,, | Performed by: STUDENT IN AN ORGANIZED HEALTH CARE EDUCATION/TRAINING PROGRAM

## 2024-05-27 PROCEDURE — 99214 OFFICE O/P EST MOD 30 MIN: CPT | Mod: S$PBB,,, | Performed by: STUDENT IN AN ORGANIZED HEALTH CARE EDUCATION/TRAINING PROGRAM

## 2024-05-27 PROCEDURE — 3074F SYST BP LT 130 MM HG: CPT | Mod: CPTII,,, | Performed by: STUDENT IN AN ORGANIZED HEALTH CARE EDUCATION/TRAINING PROGRAM

## 2024-05-27 PROCEDURE — 80048 BASIC METABOLIC PNL TOTAL CA: CPT | Performed by: STUDENT IN AN ORGANIZED HEALTH CARE EDUCATION/TRAINING PROGRAM

## 2024-05-27 PROCEDURE — 3008F BODY MASS INDEX DOCD: CPT | Mod: CPTII,,, | Performed by: STUDENT IN AN ORGANIZED HEALTH CARE EDUCATION/TRAINING PROGRAM

## 2024-05-27 RX ORDER — VORTIOXETINE 10 MG/1
10 TABLET, FILM COATED ORAL NIGHTLY
Qty: 30 TABLET | Refills: 11 | Status: SHIPPED | OUTPATIENT
Start: 2024-05-27

## 2024-05-27 NOTE — ASSESSMENT & PLAN NOTE
Patient is reporting he is not amenable to unless his cholesterol is still elevated at this visit   Did discuss patient's elevated cholesterol and triglycerides as being a risk factor for strokes and heart attacks   Patient amenable to having his cholesterol checked and potentially start a statin if it is still elevated  Checking cholesterol today

## 2024-05-27 NOTE — ASSESSMENT & PLAN NOTE
Patient did improve with anxiety and depression after starting Cymbalta however patient notes that he is having difficulty with staying awake   Have ordered Trintellix 10 mg nightly for patient will continue to monitor

## 2024-05-27 NOTE — PROGRESS NOTES
Patient Name: Jhonathan Kim     : 1970    MRN: [id number]     Subjective:     Patient ID: Jhonathan Kim is a 54 y.o. male.    Chief Complaint:   Chief Complaint   Patient presents with    Follow-up     Patient here for follow up. He wants to discuss his cymbalta. It makes him sleepy. /82        HPI: HPI  54-year-old male presents to clinic to follow-up  of depression      Depression  Patient is currently going through divorce and was started on Cymbalta at last office visit   Patient is reporting that he finds himself very sleepy on Cymbalta.  Does not matter what time he takes the medication (morning verses night)  No SI or HI and states that the medication does work but makes him tired  Patient was also referred to behavioral health at last visit for counseling  Is amenable to starting a new medication      Hyperlipidemia  Noted on labs  At previous visit patient was not amenable to taking a statin stated he would prefer to work on diet and exercise   At this visit patient reports that he has not yet begun to work on diet and exercise but is not amenable to a statin unless it is still elevated  Denies personal or family history of heart disease/stroke    Elevated creatinine    Normal GFR  Normal BUN noted periodically on blood work patient is amenable to repeat today    surgical history nasal surgery 5 years ago  Family history positive for diabetes  Social fmglibh-jueaqwtor-gceuxxidwp drinker-currently unemployed but did work for the [employer]  ROS:      ROS     12 point review of systems conducted, negative except as stated in the history of present illness. See HPI for details.    History:     Past Medical History:   Diagnosis Date    ADHD (attention deficit hyperactivity disorder)     Depression         Past Surgical History:   Procedure Laterality Date    NASAL ENDOSCOPY  2016       No family history on file.     Social History     Tobacco Use    Smoking status: Never     "Smokeless tobacco: Never   Substance and Sexual Activity    Alcohol use: Yes     Alcohol/week: 2.0 standard drinks of alcohol     Types: 2 Cans of beer per week     Comment: occ    Drug use: Never    Sexual activity: Yes       Current Outpatient Medications   Medication Instructions    aspirin 81 mg, Oral, Daily    cyclobenzaprine (FLEXERIL) 10 mg, Oral, 3 times daily PRN    esomeprazole (NEXIUM) 20 mg, Oral, Before breakfast    meloxicam (MOBIC) 15 mg, Oral, Daily    TRINTELLIX 10 mg, Oral, Nightly        Review of patient's allergies indicates:  No Known Allergies    Objective:     Visit Vitals  /82 (BP Location: Right arm, Patient Position: Sitting)   Pulse 67   Temp 97.5 °F (36.4 °C) (Oral)   Ht 5' 10" (1.778 m)   Wt 110.7 kg (244 lb)   SpO2 98%   BMI 35.01 kg/m²       Physical Examination:     Physical Exam  Constitutional:       General: He is not in acute distress.     Appearance: Normal appearance. He is not ill-appearing or diaphoretic.   HENT:      Nose: No congestion.   Eyes:      Conjunctiva/sclera: Conjunctivae normal.      Pupils: Pupils are equal, round, and reactive to light.   Cardiovascular:      Rate and Rhythm: Normal rate and regular rhythm.      Heart sounds: No murmur heard.  Pulmonary:      Effort: Pulmonary effort is normal. No respiratory distress.      Breath sounds: Normal breath sounds. No wheezing.   Musculoskeletal:         General: No swelling, tenderness, deformity or signs of injury. Normal range of motion.      Cervical back: Normal range of motion.   Skin:     General: Skin is warm and dry.      Coloration: Skin is not jaundiced.   Neurological:      General: No focal deficit present.      Mental Status: He is alert and oriented to person, place, and time. Mental status is at baseline.      Gait: Gait normal.         Lab Results:     Chemistry:  Lab Results   Component Value Date     01/10/2024    K 4.1 01/10/2024    CHLORIDE 105 01/10/2024    BUN 15.0 01/10/2024    " CREATININE 1.20 (H) 01/10/2024    EGFRNORACEVR >60 01/10/2024    GLUCOSE 96 01/10/2024    CALCIUM 9.7 01/10/2024    ALKPHOS 75 01/10/2024    LABPROT 7.6 01/10/2024    ALBUMIN 4.1 01/10/2024    AST 19 01/10/2024    ALT 28 01/10/2024    ZTBGLFMS44AS 30.4 01/10/2024    TSH 4.019 04/18/2024    HSDBXS6KOUQ 0.77 04/18/2024    PSA 0.46 01/10/2024        Lab Results   Component Value Date    HGBA1C 5.7 01/10/2024        Hematology:  Lab Results   Component Value Date    WBC 6.61 01/10/2024    HGB 15.3 01/10/2024    HCT 46.1 01/10/2024     01/10/2024       Lipid Panel:  Lab Results   Component Value Date    CHOL 302 (H) 01/10/2024    HDL 54 01/10/2024    .00 (H) 01/10/2024    TRIG 229 (H) 01/10/2024    TOTALCHOLEST 6 (H) 01/10/2024        Urine:  Lab Results   Component Value Date    COLORUA Light-Yellow 01/10/2024    APPEARANCEUA Clear 01/10/2024    SGUA 1.025 01/10/2024    PHUA 5.0 01/10/2024    PROTEINUA Negative 01/10/2024    GLUCOSEUA Normal 01/10/2024    KETONESUA Negative 01/10/2024    BLOODUA Negative 01/10/2024    NITRITESUA Negative 01/10/2024    LEUKOCYTESUR Negative 01/10/2024    RBCUA 0-5 01/10/2024    WBCUA 0-5 01/10/2024    BACTERIA None Seen 01/10/2024    SQEPUA None Seen 01/10/2024    HYALINECASTS None Seen 01/10/2024        Assessment:          ICD-10-CM ICD-9-CM   1. Mixed hyperlipidemia  E78.2 272.2   2. Elevated serum creatinine  R79.89 790.99   3. Current mild episode of major depressive disorder without prior episode  F32.0 296.21   4. Other hyperlipidemia  E78.49 272.4        Plan:     1. Mixed hyperlipidemia  -     Lipid Panel; Future; Expected date: 05/27/2024    2. Elevated serum creatinine  -     Basic Metabolic Panel; Future; Expected date: 05/27/2024    3. Current mild episode of major depressive disorder without prior episode  Overview:  Cymbalta made patient sleepy    Assessment & Plan:  Patient did improve with anxiety and depression after starting Cymbalta however patient notes  that he is having difficulty with staying awake   Have ordered Trintellix 10 mg nightly for patient will continue to monitor    Orders:  -     vortioxetine (TRINTELLIX) 10 mg Tab; Take 1 tablet (10 mg total) by mouth every evening.  Dispense: 30 tablet; Refill: 11    4. Other hyperlipidemia  Assessment & Plan:  Patient is reporting he is not amenable to unless his cholesterol is still elevated at this visit   Did discuss patient's elevated cholesterol and triglycerides as being a risk factor for strokes and heart attacks   Patient amenable to having his cholesterol checked and potentially start a statin if it is still elevated  Checking cholesterol today            No follow-ups on file.    Future Appointments   Date Time Provider Department Center   6/26/2024  8:40 AM [facility] MD [facility]   7/16/2024  2:00 PM [facility]         Roz Morgan MD

## 2024-05-28 NOTE — PROGRESS NOTES
Please let patient know that his kidney function looks much better and is normal but his cholesterol is still high at 216. His bad cholesterol is 140 and triglycerides are still high.  Based on his age and these numbers I calculated his risk of having a stroke or heart attack in the next 7 years and it is 6.7% chance. If he would like I can prescribe a medicine to lower his cholesterol and lower his risk. Please let me know.

## 2024-07-24 ENCOUNTER — OFFICE VISIT (OUTPATIENT)
Dept: FAMILY MEDICINE | Facility: CLINIC | Age: 54
End: 2024-07-24
Payer: MEDICAID

## 2024-07-24 DIAGNOSIS — E78.49 OTHER HYPERLIPIDEMIA: ICD-10-CM

## 2024-07-24 DIAGNOSIS — F32.0 CURRENT MILD EPISODE OF MAJOR DEPRESSIVE DISORDER WITHOUT PRIOR EPISODE: ICD-10-CM

## 2024-07-24 DIAGNOSIS — K21.9 GASTROESOPHAGEAL REFLUX DISEASE, UNSPECIFIED WHETHER ESOPHAGITIS PRESENT: Primary | ICD-10-CM

## 2024-07-24 PROCEDURE — 3044F HG A1C LEVEL LT 7.0%: CPT | Mod: CPTII,95,, | Performed by: STUDENT IN AN ORGANIZED HEALTH CARE EDUCATION/TRAINING PROGRAM

## 2024-07-24 PROCEDURE — 99214 OFFICE O/P EST MOD 30 MIN: CPT | Mod: 95,,, | Performed by: STUDENT IN AN ORGANIZED HEALTH CARE EDUCATION/TRAINING PROGRAM

## 2024-07-24 RX ORDER — HYDROGEN PEROXIDE 3 %
20 SOLUTION, NON-ORAL MISCELLANEOUS
Qty: 90 CAPSULE | Refills: 3 | Status: SHIPPED | OUTPATIENT
Start: 2024-07-24

## 2024-07-24 NOTE — PROGRESS NOTES
Audio Visual Telehealth Visit     The patient location is: [place]  The chief complaint leading to consultation is: Anxiety and depression  Visit type: Virtual visit with audiovisual  Total time spent with patient: 15 minutes     Each patient to whom I provide medical services by telemedicine is:  (1) informed of the relationship between the physician and patient and the respective role of any other health care provider with respect to management of the patient; and (2) notified that they may decline to receive medical services by telemedicine and may withdraw from such care at any time. Patient verbally consented to receive this service via audiovisual.  Patient Name: Jhonathan Kim   : 1970  MRN: [id number]     Subjective:   Patient ID: Jhonathan Kim is a 54 y.o. male.    Chief Complaint: No chief complaint on file.       HPI: HPI  54-year-old male presents to clinic to follow-up  of depression      Depression  Patient is currently going through divorce and was started on Cymbalta at last office visit   Patient is reporting that he finds himself very sleepy on Cymbalta.  Does not matter what time he takes the medication (morning verses night)  No SI or HI and states that the medication does work but makes him tired  Patient was also referred to behavioral health at last visit for counseling  Is amenable to starting a new medication  Attempted to stop Cymbalta at previous visit and start Trintellix however there appears to be an issue with patient's insurance so therefore he has been continuing on Cymbalta        Hyperlipidemia  Noted on labs  At previous visit patient was not amenable to taking a statin stated he would prefer to work on diet and exercise   Did have labs last visit which show improvement    Elevated creatinine    Normal GFR      ROS:  ROS   History:     Past Medical History:   Diagnosis Date    ADHD (attention deficit hyperactivity disorder)     Depression       Past Surgical  History:   Procedure Laterality Date    NASAL ENDOSCOPY  01/01/2016     No family history on file.   Social History     Tobacco Use    Smoking status: Never    Smokeless tobacco: Never   Substance and Sexual Activity    Alcohol use: Yes     Alcohol/week: 2.0 standard drinks of alcohol     Types: 2 Cans of beer per week     Comment: occ    Drug use: Never    Sexual activity: Yes        Allergies: Review of patient's allergies indicates:  No Known Allergies  Objective:   There were no vitals filed for this visit.  There is no height or weight on file to calculate BMI.     Physical Examination:   Physical Exam  Patient in no acute distress able to speak in full and complete sentences  Assessment:     Problem List Items Addressed This Visit          Psychiatric    Current mild episode of major depressive disorder without prior episode    Overview     Cymbalta made patient sleepy         Current Assessment & Plan     Has been unable to fill Trintellix would like to try to get this medication filled before trying to switch to a different medication  Having office complete PA            Cardiac/Vascular    Other hyperlipidemia    Current Assessment & Plan     Improving. Not amenable to statin at this time. Would like to continue to work on diet and exercise.           Other Visit Diagnoses       Gastroesophageal reflux disease, unspecified whether esophagitis present    -  Primary    Relevant Medications    esomeprazole (NEXIUM) 20 MG capsule            Plan:   Diagnoses and all orders for this visit:    Gastroesophageal reflux disease, unspecified whether esophagitis present  -     esomeprazole (NEXIUM) 20 MG capsule; Take 1 capsule (20 mg total) by mouth before breakfast.    Other hyperlipidemia    Current mild episode of major depressive disorder without prior episode       Problem List Items Addressed This Visit          Psychiatric    Current mild episode of major depressive disorder without prior episode    Overview      Cymbalta made patient sleepy         Current Assessment & Plan     Has been unable to fill Trintellix would like to try to get this medication filled before trying to switch to a different medication  Having office complete PA            Cardiac/Vascular    Other hyperlipidemia    Current Assessment & Plan     Improving. Not amenable to statin at this time. Would like to continue to work on diet and exercise.           Other Visit Diagnoses       Gastroesophageal reflux disease, unspecified whether esophagitis present    -  Primary    Relevant Medications    esomeprazole (NEXIUM) 20 MG capsule           Follow up in about 6 weeks (around 9/4/2024) for Virtual Visit or F2F anxiety and depression.       This note was created with the assistance of a voice recognition software or phone dictation. There may be transcription errors as a result of using this technology however minimal. Effort has been made to assure accuracy of transcription but any obvious errors or omissions should be clarified with the author of the document      This service was not originating from a related E/M service provided within the previous 7 days nor will  to an E/M service or procedure within the next 24 hours or my soonest available appointment.  Prevailing standard of care was able to be met in this time audiovisual.

## 2024-07-25 ENCOUNTER — TELEPHONE (OUTPATIENT)
Dept: FAMILY MEDICINE | Facility: CLINIC | Age: 54
End: 2024-07-25
Payer: MEDICAID

## 2024-07-25 NOTE — ASSESSMENT & PLAN NOTE
Improving. Not amenable to statin at this time. Would like to continue to work on diet and exercise.

## 2024-07-25 NOTE — ASSESSMENT & PLAN NOTE
Has been unable to fill Trintellix would like to try to get this medication filled before trying to switch to a different medication  Having office complete PA

## 2024-07-25 NOTE — TELEPHONE ENCOUNTER
Calling pharmacy because patient reported that he cannot get his medication.     Both trintellix and nexium need a PA.   PA for this medication Submitted via cover my meds and has been approved.

## 2024-07-30 DIAGNOSIS — K21.9 GASTROESOPHAGEAL REFLUX DISEASE, UNSPECIFIED WHETHER ESOPHAGITIS PRESENT: Primary | ICD-10-CM

## 2024-07-30 RX ORDER — PANTOPRAZOLE SODIUM 40 MG/1
40 TABLET, DELAYED RELEASE ORAL DAILY
Qty: 90 TABLET | Refills: 3 | Status: SHIPPED | OUTPATIENT
Start: 2024-07-30 | End: 2025-07-30

## 2024-08-29 ENCOUNTER — OFFICE VISIT (OUTPATIENT)
Dept: FAMILY MEDICINE | Facility: CLINIC | Age: 54
End: 2024-08-29
Payer: MEDICAID

## 2024-08-29 VITALS
HEIGHT: 70 IN | HEART RATE: 61 BPM | TEMPERATURE: 98 F | BODY MASS INDEX: 34.5 KG/M2 | OXYGEN SATURATION: 99 % | SYSTOLIC BLOOD PRESSURE: 130 MMHG | DIASTOLIC BLOOD PRESSURE: 87 MMHG | WEIGHT: 241 LBS

## 2024-08-29 DIAGNOSIS — F32.0 CURRENT MILD EPISODE OF MAJOR DEPRESSIVE DISORDER WITHOUT PRIOR EPISODE: ICD-10-CM

## 2024-08-29 DIAGNOSIS — Z72.51 UNPROTECTED SEX: Primary | ICD-10-CM

## 2024-08-29 LAB
C TRACH DNA SPEC QL NAA+PROBE: NOT DETECTED
HAV IGM SERPL QL IA: NONREACTIVE
HBV CORE IGM SERPL QL IA: NONREACTIVE
HBV SURFACE AG SERPL QL IA: NONREACTIVE
HCV AB SERPL QL IA: NONREACTIVE
HIV 1+2 AB+HIV1 P24 AG SERPL QL IA: NONREACTIVE
N GONORRHOEA DNA SPEC QL NAA+PROBE: NOT DETECTED
SOURCE (OHS): NORMAL

## 2024-08-29 PROCEDURE — 87389 HIV-1 AG W/HIV-1&-2 AB AG IA: CPT | Performed by: STUDENT IN AN ORGANIZED HEALTH CARE EDUCATION/TRAINING PROGRAM

## 2024-08-29 PROCEDURE — 99213 OFFICE O/P EST LOW 20 MIN: CPT | Mod: PBBFAC,PN | Performed by: STUDENT IN AN ORGANIZED HEALTH CARE EDUCATION/TRAINING PROGRAM

## 2024-08-29 PROCEDURE — 87591 N.GONORRHOEAE DNA AMP PROB: CPT | Performed by: STUDENT IN AN ORGANIZED HEALTH CARE EDUCATION/TRAINING PROGRAM

## 2024-08-29 PROCEDURE — 36415 COLL VENOUS BLD VENIPUNCTURE: CPT | Performed by: STUDENT IN AN ORGANIZED HEALTH CARE EDUCATION/TRAINING PROGRAM

## 2024-08-29 PROCEDURE — 80074 ACUTE HEPATITIS PANEL: CPT | Performed by: STUDENT IN AN ORGANIZED HEALTH CARE EDUCATION/TRAINING PROGRAM

## 2024-08-29 PROCEDURE — 87491 CHLMYD TRACH DNA AMP PROBE: CPT | Performed by: STUDENT IN AN ORGANIZED HEALTH CARE EDUCATION/TRAINING PROGRAM

## 2024-08-29 RX ORDER — VORTIOXETINE 20 MG/1
20 TABLET, FILM COATED ORAL DAILY
Qty: 90 TABLET | Refills: 3 | Status: SHIPPED | OUTPATIENT
Start: 2024-08-29

## 2024-08-29 RX ORDER — VALACYCLOVIR HYDROCHLORIDE 500 MG/1
500 TABLET, FILM COATED ORAL 2 TIMES DAILY
Qty: 6 TABLET | Refills: 11 | Status: SHIPPED | OUTPATIENT
Start: 2024-08-29 | End: 2024-09-01

## 2024-08-29 NOTE — PROGRESS NOTES
Patient Name: Jhonathan Kim     : 1970    MRN: [id number]     Subjective:     Patient ID: Jhonathan Kim is a 54 y.o. male.    Chief Complaint:   Chief Complaint   Patient presents with    Follow-up     Patient here for follow up. No problems. /87        HPI: HPI  54-year-old male presents to clinic to follow-up  of depression        Depression  Patient is currently going through divorce and was started on Cymbalta Cymbalta was not relieving depression symptoms effectively therefore patient was switch to Trintellix 10 mg  Reports he is on his 2nd bottle of Trintellix feels that his anxiety and depression are somewhat better but feels that he still wants to isolate   No SI or HI would like to potentially increase dose    Also asking for STI testing    Genital Herpes  Reporting that he has not had to take Valtrex in a long time as he had not been having outbreaks but now reports that he is having outbreaks and would like Valtrex.      Chronic Issues not Discussed      Hyperlipidemia  Noted on labs  At previous visit patient was not amenable to taking a statin stated he would prefer to work on diet and exercise   Did have labs last visit which show improvement    Elevated creatinine    Normal GFR      ROS:      ROS     12 point review of systems conducted, negative except as stated in the history of present illness. See HPI for details.    History:     Past Medical History:   Diagnosis Date    ADHD (attention deficit hyperactivity disorder)     Depression         Past Surgical History:   Procedure Laterality Date    NASAL ENDOSCOPY  2016       No family history on file.     Social History     Tobacco Use    Smoking status: Never    Smokeless tobacco: Never   Substance and Sexual Activity    Alcohol use: Yes     Alcohol/week: 2.0 standard drinks of alcohol     Types: 2 Cans of beer per week     Comment: occ    Drug use: Never    Sexual activity: Yes       Current Outpatient Medications  "  Medication Instructions    aspirin 81 mg, Daily    meloxicam (MOBIC) 15 mg, Oral, Daily    pantoprazole (PROTONIX) 40 mg, Oral, Daily    TRINTELLIX 20 mg, Oral, Daily    valACYclovir (VALTREX) 500 mg, Oral, 2 times daily        Review of patient's allergies indicates:  No Known Allergies    Objective:     Visit Vitals  /87 (BP Location: Right arm, Patient Position: Sitting)   Pulse 61   Temp 97.6 °F (36.4 °C) (Oral)   Ht 5' 10" (1.778 m)   Wt 109.3 kg (241 lb)   SpO2 99%   BMI 34.58 kg/m²       Physical Examination:     Physical Exam  Constitutional:       General: He is not in acute distress.     Appearance: Normal appearance. He is not ill-appearing or diaphoretic.   HENT:      Nose: No congestion.   Eyes:      Conjunctiva/sclera: Conjunctivae normal.      Pupils: Pupils are equal, round, and reactive to light.   Cardiovascular:      Rate and Rhythm: Normal rate and regular rhythm.      Heart sounds: No murmur heard.  Pulmonary:      Effort: Pulmonary effort is normal. No respiratory distress.      Breath sounds: Normal breath sounds. No wheezing.   Musculoskeletal:         General: No swelling, tenderness, deformity or signs of injury. Normal range of motion.      Cervical back: Normal range of motion.   Skin:     General: Skin is warm and dry.      Coloration: Skin is not jaundiced.   Neurological:      General: No focal deficit present.      Mental Status: He is alert and oriented to person, place, and time. Mental status is at baseline.      Gait: Gait normal.         Lab Results:     Chemistry:  Lab Results   Component Value Date     05/27/2024    K 3.9 05/27/2024    BUN 14.6 05/27/2024    CREATININE 1.15 05/27/2024    EGFRNORACEVR >60 05/27/2024    GLUCOSE 96 05/27/2024    CALCIUM 9.2 05/27/2024    ALKPHOS 75 01/10/2024    LABPROT 7.6 01/10/2024    ALBUMIN 4.1 01/10/2024    AST 19 01/10/2024    ALT 28 01/10/2024    [id number] 30.4 01/10/2024    TSH 4.019 04/18/2024    QBIYOX8SNFL 0.77 " 04/18/2024    PSA 0.46 01/10/2024        Lab Results   Component Value Date    HGBA1C 5.7 01/10/2024        Hematology:  Lab Results   Component Value Date    WBC 6.61 01/10/2024    HGB 15.3 01/10/2024    HCT 46.1 01/10/2024     01/10/2024       Lipid Panel:  Lab Results   Component Value Date    CHOL 216 (H) 05/27/2024    HDL 47 05/27/2024    .00 05/27/2024    TRIG 147 (H) 05/27/2024    TOTALCHOLEST 5 05/27/2024        Urine:  Lab Results   Component Value Date    APPEARANCEUA Clear 01/10/2024    SGUA 1.025 01/10/2024    PROTEINUA Negative 01/10/2024    KETONESUA Negative 01/10/2024    LEUKOCYTESUR Negative 01/10/2024    RBCUA 0-5 01/10/2024    WBCUA 0-5 01/10/2024    BACTERIA None Seen 01/10/2024    SQEPUA None Seen 01/10/2024    HYALINECASTS None Seen 01/10/2024        Assessment:          ICD-10-CM ICD-9-CM   1. Unprotected sex  Z72.51 V69.2   2. Current mild episode of major depressive disorder without prior episode  F32.0 296.21        Plan:     1. Unprotected sex  Assessment & Plan:  STI testing as below    Orders:  -     Cancel: Trichomonas vaginalis Amplified RNA  -     Chlamydia/GC, PCR  -     RPR (DX) with reflex to titer and confirmatory testing; Future; Expected date: 08/29/2024  -     Hepatitis Panel, Acute; Future; Expected date: 08/29/2024  -     HIV 1/2 Ag/Ab (4th Gen); Future; Expected date: 08/29/2024  -     Trichomonas Vaginalis, TIMOTHY    2. Current mild episode of major depressive disorder without prior episode  Overview:  Cymbalta made patient sleepy    Assessment & Plan:  Improved however still symptomatic; increasing Trintellix to 20 mg daily      Other orders  -     vortioxetine (TRINTELLIX) 20 mg Tab; Take 1 tablet (20 mg total) by mouth Daily.  Dispense: 90 tablet; Refill: 3  -     valACYclovir (VALTREX) 500 MG tablet; Take 1 tablet (500 mg total) by mouth 2 (two) times daily. for 3 days  Dispense: 6 tablet; Refill: 11         Follow up in about 8 months (around 4/29/2025) for  med ajdustment.    No future appointments.     Roz Morgan MD

## 2024-08-30 ENCOUNTER — PATIENT MESSAGE (OUTPATIENT)
Dept: FAMILY MEDICINE | Facility: CLINIC | Age: 54
End: 2024-08-30
Payer: MEDICAID

## 2024-09-07 ENCOUNTER — PATIENT MESSAGE (OUTPATIENT)
Dept: FAMILY MEDICINE | Facility: CLINIC | Age: 54
End: 2024-09-07
Payer: MEDICAID

## 2024-09-09 NOTE — TELEPHONE ENCOUNTER
Care Due:                  Date            Visit Type   Department     Provider  --------------------------------------------------------------------------------                                EP -                              [facility]  Last Visit: 08-      CARE ([facility])   AUTUMN Morgan                               -                              [facility]  Next Visit: 10-      CARE ([facility])   AUTUMN Morgan                                                            Last  Test          Frequency    Reason                     Performed    Due Date  --------------------------------------------------------------------------------    CBC.........  12 months..  valACYclovir.............  01-   09-    [facility] Embedded Care Due Messages. Reference number: [id number].   9/09/2024 12:49:03 PM CDT

## 2024-09-10 RX ORDER — VALACYCLOVIR HYDROCHLORIDE 500 MG/1
500 TABLET, FILM COATED ORAL 2 TIMES DAILY
Qty: 6 TABLET | Refills: 11 | Status: SHIPPED | OUTPATIENT
Start: 2024-09-10

## 2024-09-10 NOTE — TELEPHONE ENCOUNTER
I am going to keep his prescription as is. He told me in his last appointment on 8/29/2024 that he has not had an outbreak in a long time and just wanted some to keep on hand. If he is having continued outbreaks, He will need an appointment to discuss suppressive dosing. This should be enough the way it was prescribed to have it one hand.

## 2025-01-30 DIAGNOSIS — M25.511 CHRONIC PAIN OF BOTH SHOULDERS: ICD-10-CM

## 2025-01-30 DIAGNOSIS — M25.512 CHRONIC PAIN OF BOTH SHOULDERS: ICD-10-CM

## 2025-01-30 DIAGNOSIS — G89.29 CHRONIC PAIN OF BOTH SHOULDERS: ICD-10-CM

## 2025-01-30 NOTE — TELEPHONE ENCOUNTER
----- Message from Keren sent at 1/30/2025 10:15 AM CST -----  Who Called: Jhonathan Kim    Pharmacy is calling to request assistance with Rx    Pharmacy name and phone number: [id number] - [place] - [address]  AT [facility] [address]   Phone: [phone]  Fax: [phone]  Pharmacy contact: lexie  Patient Name: samreen  Prescription Name: meloxicam (MOBIC) 15 MG tablet 30 tablet   What do they need to clarify?:refill        Preferred Method of Contact: Phone Call  Patient's Preferred Phone Number on File: [phone]   Best Call Back Number, if different:  Additional Information: pharmacy call, please advise, thanks

## 2025-01-31 RX ORDER — MELOXICAM 15 MG/1
15 TABLET ORAL DAILY
Qty: 30 TABLET | Refills: 6 | Status: SHIPPED | OUTPATIENT
Start: 2025-01-31

## 2025-01-31 NOTE — TELEPHONE ENCOUNTER
Care Due:                  Date            Visit Type   Department     Provider  --------------------------------------------------------------------------------                                EP -                              PRIMARY      [facility]  Last Visit: 08-      CARE ([facility])   MEDICINE       Roz Morgan  Next Visit: None Scheduled  None         None Found                                                            Last  Test          Frequency    Reason                     Performed    Due Date  --------------------------------------------------------------------------------    CBC.........  12 months..  meloxicam, valACYclovir..  01-   01-    CMP.........  12 months..  meloxicam................  01-   01-    [employer] Embedded Care Due Messages. Reference number: [id number].   1/31/2025 10:08:07 AM CST

## 2025-06-19 ENCOUNTER — TELEPHONE (OUTPATIENT)
Dept: INTERNAL MEDICINE | Facility: CLINIC | Age: 55
End: 2025-06-19
Payer: MEDICAID

## 2025-06-19 NOTE — TELEPHONE ENCOUNTER
Copied from CRM #[id number]. Topic: General Inquiry - Patient Advice  >> Jun 19, 2025 10:40 AM Kelly wrote:  Who Called: Jhonathan Kim    Caller is requesting assistance/information from provider's office.    Symptoms (please be specific):    How long has patient had these symptoms:    List of preferred pharmacies on file (remove unneeded): [unfilled]  If different, enter pharmacy into here including location and phone number:       Preferred Method of Contact: Phone Call  Patient's Preferred Phone Number on File: [phone]   Best Call Back Number, if different:  Additional Information: Pt states he is returning a call

## 2025-08-07 ENCOUNTER — TELEPHONE (OUTPATIENT)
Dept: FAMILY MEDICINE | Facility: CLINIC | Age: 55
End: 2025-08-07
Payer: MEDICAID

## 2025-08-08 ENCOUNTER — TELEPHONE (OUTPATIENT)
Dept: FAMILY MEDICINE | Facility: CLINIC | Age: 55
End: 2025-08-08
Payer: MEDICAID